# Patient Record
Sex: MALE | Race: WHITE | NOT HISPANIC OR LATINO | Employment: UNEMPLOYED | URBAN - NONMETROPOLITAN AREA
[De-identification: names, ages, dates, MRNs, and addresses within clinical notes are randomized per-mention and may not be internally consistent; named-entity substitution may affect disease eponyms.]

---
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
## 2021-04-12 ENCOUNTER — OFFICE VISIT (OUTPATIENT)
Dept: FAMILY MEDICINE CLINIC | Facility: CLINIC | Age: 21
End: 2021-04-12

## 2021-04-12 VITALS
TEMPERATURE: 97.1 F | BODY MASS INDEX: 30.97 KG/M2 | HEIGHT: 71 IN | DIASTOLIC BLOOD PRESSURE: 62 MMHG | OXYGEN SATURATION: 99 % | SYSTOLIC BLOOD PRESSURE: 120 MMHG | WEIGHT: 221.2 LBS | HEART RATE: 72 BPM

## 2021-04-12 DIAGNOSIS — Z00.00 NORMAL EXAM: Primary | ICD-10-CM

## 2021-04-12 PROCEDURE — 99395 PREV VISIT EST AGE 18-39: CPT | Performed by: NURSE PRACTITIONER

## 2021-04-12 NOTE — PROGRESS NOTES
"  Chief Complaint  Annual Exam (ROTC)  No Concerns    Subjective          Rashawn Beck presents to [facility]  History of Present Illness  Sports Physical   Patient here for school sports physical exam. Patient/parent deny any current health related concerns. He plans to participate in ROTC    Objective   Vital Signs:   /62   Pulse 72   Temp 97.1 °F (36.2 °C) (Infrared)   Ht 180.3 cm (71\")   Wt 100 kg (221 lb 3.2 oz)   SpO2 99%   BMI 30.85 kg/m²     Physical Exam  Vitals and nursing note reviewed.   Constitutional:       General: He is not in acute distress.     Appearance: He is well-developed. He is not ill-appearing.   HENT:      Head: Normocephalic.      Right Ear: Tympanic membrane, ear canal and external ear normal. There is no impacted cerumen.      Left Ear: Tympanic membrane, ear canal and external ear normal. There is no impacted cerumen.      Nose: Nose normal. No congestion or rhinorrhea.      Mouth/Throat:      Pharynx: No oropharyngeal exudate or posterior oropharyngeal erythema.   Eyes:      Conjunctiva/sclera: Conjunctivae normal.   Cardiovascular:      Rate and Rhythm: Normal rate and regular rhythm.      Heart sounds: Normal heart sounds. No murmur heard.     Pulmonary:      Effort: Pulmonary effort is normal.      Breath sounds: Normal breath sounds.   Abdominal:      General: Abdomen is flat. Bowel sounds are normal. There is no distension.      Tenderness: There is no abdominal tenderness.   Skin:     General: Skin is warm and dry.   Neurological:      General: No focal deficit present.      Mental Status: He is alert and oriented to person, place, and time.   Psychiatric:         Mood and Affect: Mood normal.         Behavior: Behavior normal.         Thought Content: Thought content normal.         Judgment: Judgment normal.        Result Review :                 Assessment and Plan    Diagnoses and all orders for this visit:    1. Normal exam " (Primary)        Follow Up   No follow-ups on file.  Patient was given instructions and counseling regarding his condition or for health maintenance advice. Please see specific information pulled into the AVS if appropriate.         Immunization History   Administered Date(s) Administered   • COVID-19 (MODERNA) 01/15/2021, 02/12/2021   • DTaP, Unspecified 2001, 2001, 2001, 09/11/2002, 05/12/2005   • Flu Mist 02/17/2016   • HPV Quadrivalent 06/03/2011, 08/04/2011, 01/09/2012   • Hep A, 2 Dose 10/25/2006, 02/12/2008   • Hep B, Adolescent or Pediatric 2001, 2001, 2001   • HiB 2001, 2001, 06/12/2002   • IPV 2001, 2001, 03/21/2002, 06/12/2002, 05/12/2005   • MMR 06/12/2002, 05/12/2005   • Meningococcal MCV4P (Menactra) 05/25/2012, 02/21/2018   • Pneumococcal, Unspecified 2001, 2001, 2001   • Tdap 05/25/2012, 07/31/2017   • Varicella 03/03/2003, 08/23/2010